# Patient Record
Sex: MALE | Race: WHITE | Employment: OTHER | URBAN - METROPOLITAN AREA
[De-identification: names, ages, dates, MRNs, and addresses within clinical notes are randomized per-mention and may not be internally consistent; named-entity substitution may affect disease eponyms.]

---

## 2017-02-24 ENCOUNTER — OFFICE VISIT (OUTPATIENT)
Dept: FAMILY MEDICINE CLINIC | Age: 82
End: 2017-02-24

## 2017-02-24 VITALS
SYSTOLIC BLOOD PRESSURE: 163 MMHG | WEIGHT: 169 LBS | HEART RATE: 96 BPM | HEIGHT: 68 IN | OXYGEN SATURATION: 100 % | TEMPERATURE: 98 F | DIASTOLIC BLOOD PRESSURE: 94 MMHG | RESPIRATION RATE: 16 BRPM | BODY MASS INDEX: 25.61 KG/M2

## 2017-02-24 DIAGNOSIS — R44.3 HALLUCINATIONS: Primary | ICD-10-CM

## 2017-02-24 DIAGNOSIS — R41.0 DELIRIUM: ICD-10-CM

## 2017-02-24 NOTE — MR AVS SNAPSHOT
Visit Information Date & Time Provider Department Dept. Phone Encounter #  
 2/24/2017  2:40 PM [id number] Herb Garcia [facility] [phone] [id number] Upcoming Health Maintenance Date Due DTaP/Tdap/Td series (1 - Tdap) 5/2/1951 ZOSTER VACCINE AGE 60> 5/2/1990 GLAUCOMA SCREENING Q2Y 5/2/1995 Pneumococcal 65+ Low/Medium Risk (1 of 2 - PCV13) 5/2/1995 MEDICARE YEARLY EXAM 5/2/1995 INFLUENZA AGE 9 TO ADULT 8/1/2016 Allergies as of 2/24/2017  Review Complete On: 2/24/2017 By: Singh Templeton LPN No Known Allergies Current Immunizations  Never Reviewed No immunizations on file. Not reviewed this visit You Were Diagnosed With   
  
 Codes Comments Hallucinations    -  Primary ICD-10-CM: R44.3 ICD-9-CM: 780.1 Delirium     ICD-10-CM: R41.0 ICD-9-CM: 780.09 Vitals BP  
  
  
  
  
  
 (!) 163/94 Vitals History BMI and BSA Data Body Mass Index Body Surface Area 25.7 kg/m 2 1.92 m 2 Your Updated Medication List  
  
   
This list is accurate as of: 2/24/17  4:09 PM.  Always use your most recent med list.  
  
  
  
  
 albuterol 90 mcg/actuation inhaler Commonly known as:  PROVENTIL HFA, VENTOLIN HFA, PROAIR HFA Take 2 Puffs by inhalation every four (4) hours as needed for Wheezing or Shortness of Breath. FLUAD 3104-6406 (65 YR UP)(PF) Syrg injection Generic drug:  influenza vaccine 2016-17 (65 yr+)(PF)  
ADM 0.5ML IM UTD We Performed the Following REFERRAL TO NEUROLOGY [WXC58 Custom] Comments:  
 Please evaluate patient for dementia/hallucinations REFERRAL TO PSYCHIATRY [REF91 Custom] Comments:  
 Please evaluate patient for hallucinations, dementia Referral Information Referral ID Referred By Referred To  
  
 [id number] Yossi PEDRO MD [address] [facility] [place], [address] [facility] [address] Phone: [phone] Fax: [phone] Visits Status Start Date End Date 1 New Request 2/24/17 2/24/18 If your referral has a status of pending review or denied, additional information will be sent to support the outcome of this decision. Referral ID Referred By Referred To  
 [id number] SUREKHA PEDRO Not Available Visits Status Start Date End Date 1 New Request 2/24/17 2/24/18 If your referral has a status of pending review or denied, additional information will be sent to support the outcome of this decision. Patient Instructions [address] [address], [address] [place], [address] Phone: [phone] Fax: [phone] [address] [facility] Office Building, [address] [place], [address] Phone: [phone] Fax: [phone] Alfredo Celestin M.D. [place], [address] Shan Urena M.D. [facility] [phone] [address], [address] [phone] [address] [place] [address], [address] 
[phone] Dementia: Care Instructions Your Care Instructions Dementia is a loss of mental skills that affects your daily life. It is different than the occasional trouble with memory that is part of aging. You may find it hard to remember things that you feel you should be able to remember. Or you may feel that your mind is just not working as well as usual. 
Finding out that you have dementia is a shock. You may be afraid and worried about how the condition will change your life. Although there is no cure at this time, medicine may slow memory loss and improve thinking for a while.  Other medicines may be able to help you sleep or cope with depression and behavior changes. Dementia often gets worse slowly. But it can get worse quickly. As dementia gets worse, it may become harder to do common things that take planning, like making a list and going shopping. Over time, the disease may make it hard for you to take care of yourself. Some people with dementia need others to help care for them. Dementia is different for everyone. You may be able to function well for a long time. In the early stage of the condition, you can do things at home to make life easier and safer. You also can keep doing your hobbies and other activities. Many people find comfort in planning now for their future needs. Follow-up care is a key part of your treatment and safety. Be sure to make and go to all appointments, and call your doctor if you are having problems. Its also a good idea to know your test results and keep a list of the medicines you take. How can you care for yourself at home? · Take your medicines exactly as prescribed. Call your doctor if you think you are having a problem with your medicine. · Eat healthy foods. Eat lots of whole grains, fruits, and vegetables every day. If you are not hungry, try snacks or nutritional drinks such as Boost, Ensure, or Sustacal. 
· If you have problems sleeping: ¨ Try not to nap too close to your bedtime. ¨ Exercise regularly. Walking is a good choice. ¨ Try a glass of warm milk or caffeine-free herbal tea before bed. · Do tasks and activities during the time of day when you feel your best. It may help to develop a daily routine. · Post labels, lists, and sticky notes to help you remember things. Write your activities on a calendar you can easily find. Put your clock where you can easily see it. · Stay active. Take walks in familiar places, or with friends or loved ones. Try to stay active mentally too. Read and work crossword puzzles if you enjoy these activities. · Do not drive unless you can pass an on-road driving test. If you are not sure if you are safe to drive, your state s license bureau can test you. · Keep a cordless phone and a flashlight with new batteries by your bed. If possible, put a phone in each of the main rooms of your house, or carry a cell phone in case you fall and cannot reach a phone. Or, you can wear a device around your neck or wrist. You push a button that sends a signal for help. Acknowledge your emotions and plan for the future · Talk openly and honestly with your doctor. · Let yourself grieve. It is common to feel angry, scared, frustrated, anxious, or depressed. · Get emotional support from family, friends, a support group, or a counselor experienced in working with people who have dementia. · Ask for help if you need it. · Plan for the future. ¨ Talk to your family and doctor about preparing a living will and other important papers while you can make decisions. These papers tell your doctors how to care for you at the end of your life. ¨ Consider naming a person to make decisions about your care if you are not able to. When should you call for help? Call 911 anytime you think you may need emergency care. For example, call if: 
· You are lost and do not know whom to call. · You are injured and do not know whom to call. Call your doctor now or seek immediate medical care if: 
· You are more confused or upset than usual. 
· You feel like you could hurt yourself because your mind is not working well. Watch closely for changes in your health, and be sure to contact your doctor if you have any problems. Where can you learn more? Go to http://ender-leon.info/. Enter E876 in the search box to learn more about \"Dementia: Care Instructions. \" Current as of: July 26, 2016 Content Version: 11.1 © 5857-1450 [employer].  Care instructions adapted under license by [address] (which disclaims liability or warranty for this information). If you have questions about a medical condition or this instruction, always ask your healthcare professional. Colt Miles any warranty or liability for your use of this information. .Delora Lanes Introducing [facility]! [employer] introduces [employer] patient portal. Now you can access parts of your medical record, email your doctor's office, and request medication refills online. 1. In your internet browser, go to [email or web address] Heiya 2. Click on the First Time User? Click Here link in the Sign In box. You will see the New Member Sign Up page. 3. Enter your [employer] Access Code exactly as it appears below. You will not need to use this code after youve completed the sign-up process. If you do not sign up before the expiration date, you must request a new code. · [employer] Access Code: [email or web address] Expires: 5/25/2017  4:09 PM 
 
4. Enter the last four digits of your Social Security Number (xxxx) and Date of Birth (mm/dd/yyyy) as indicated and click Submit. You will be taken to the next sign-up page. 5. Create a [employer] ID. This will be your [employer] login ID and cannot be changed, so think of one that is secure and easy to remember. 6. Create a [employer] password. You can change your password at any time. 7. Enter your Password Reset Question and Answer. This can be used at a later time if you forget your password. 8. Enter your e-mail address. You will receive e-mail notification when new information is available in [address] 9. Click Sign Up. You can now view and download portions of your medical record. 10. Click the Download Summary menu link to download a portable copy of your medical information. If you have questions, please visit the Frequently Asked Questions section of the [employer] website.  Remember, [employer] is NOT to be used for urgent needs. For medical emergencies, dial 911. Now available from your iPhone and Android! Please provide this summary of care documentation to your next provider. Your primary care clinician is listed as Nicole Jacinto. If you have any questions after today's visit, please call [phone].

## 2017-02-24 NOTE — PATIENT INSTRUCTIONS
[address] [facility] [address], [address] [facility], [address]  Phone: [phone]  Fax: [phone]    [address]  [address], [address] [facility], [address]  Phone: [phone]  Fax: [phone]    Clau Ni M.D. [place], [address] [facility], [address] [facility]    Josseline Dong M.D. [facility]  [phone]    [address]  [phone]  [phone]    [address] [place] [address]  [address] [place] [address], [address] [place] [address]  [place], [address]  [phone]     Dementia: Care Instructions  Your Care Instructions  Dementia is a loss of mental skills that affects your daily life. It is different than the occasional trouble with memory that is part of aging. You may find it hard to remember things that you feel you should be able to remember. Or you may feel that your mind is just not working as well as usual.  Finding out that you have dementia is a shock. You may be afraid and worried about how the condition will change your life. Although there is no cure at this time, medicine may slow memory loss and improve thinking for a while. Other medicines may be able to help you sleep or cope with depression and behavior changes. Dementia often gets worse slowly. But it can get worse quickly. As dementia gets worse, it may become harder to do common things that take planning, like making a list and going shopping. Over time, the disease may make it hard for you to take care of yourself. Some people with dementia need others to help care for them. Dementia is different for everyone. You may be able to function well for a long time. In the early stage of the condition, you can do things at home to make life easier and safer. You also can keep doing your hobbies and other activities.  Many people find comfort in planning now for their future needs. Follow-up care is a key part of your treatment and safety. Be sure to make and go to all appointments, and call your doctor if you are having problems. Its also a good idea to know your test results and keep a list of the medicines you take. How can you care for yourself at home? · Take your medicines exactly as prescribed. Call your doctor if you think you are having a problem with your medicine. · Eat healthy foods. Eat lots of whole grains, fruits, and vegetables every day. If you are not hungry, try snacks or nutritional drinks such as Boost, Ensure, or Sustacal.  · If you have problems sleeping:  ¨ Try not to nap too close to your bedtime. ¨ Exercise regularly. Walking is a good choice. ¨ Try a glass of warm milk or caffeine-free herbal tea before bed. · Do tasks and activities during the time of day when you feel your best. It may help to develop a daily routine. · Post labels, lists, and sticky notes to help you remember things. Write your activities on a calendar you can easily find. Put your clock where you can easily see it. · Stay active. Take walks in familiar places, or with friends or loved ones. Try to stay active mentally too. Read and work crossword puzzles if you enjoy these activities. · Do not drive unless you can pass an on-road driving test. If you are not sure if you are safe to drive, your state s license bureau can test you. · Keep a cordless phone and a flashlight with new batteries by your bed. If possible, put a phone in each of the main rooms of your house, or carry a cell phone in case you fall and cannot reach a phone. Or, you can wear a device around your neck or wrist. You push a button that sends a signal for help. Acknowledge your emotions and plan for the future  · Talk openly and honestly with your doctor. · Let yourself grieve. It is common to feel angry, scared, frustrated, anxious, or depressed.   · Get emotional support from family, friends, a support group, or a counselor experienced in working with people who have dementia. · Ask for help if you need it. · Plan for the future. ¨ Talk to your family and doctor about preparing a living will and other important papers while you can make decisions. These papers tell your doctors how to care for you at the end of your life. ¨ Consider naming a person to make decisions about your care if you are not able to. When should you call for help? Call 911 anytime you think you may need emergency care. For example, call if:  · You are lost and do not know whom to call. · You are injured and do not know whom to call. Call your doctor now or seek immediate medical care if:  · You are more confused or upset than usual.  · You feel like you could hurt yourself because your mind is not working well. Watch closely for changes in your health, and be sure to contact your doctor if you have any problems. Where can you learn more? Go to http://ender-leon.info/. Enter U432 in the search box to learn more about \"Dementia: Care Instructions. \"  Current as of: July 26, 2016  Content Version: 11.1  © 9604-7235 [employer]. Care instructions adapted under license by [employer] (which disclaims liability or warranty for this information). If you have questions about a medical condition or this instruction, always ask your healthcare professional. [address] [address] any warranty or liability for your use of this information.

## 2017-02-24 NOTE — PROGRESS NOTES
HISTORY OF PRESENT ILLNESS  Jose Armando Contreras Sr. is a 80 y.o. male. HPI  79 yo male who presents as a new patient with his partner. He reports that his chief complaint is to 'get him away from her' (in reference to his partner). Patient was unable to give a proper history voicing that partner is having affairs with multiple men, she hates him and is trying to take his money. Partner states that this is all untrue and patient has been deteriorating since last year and has had hallucinations about people that weren't present and has been bothering the neighbors with untrue information regarding intruders etc. Evaluated by Dr. Ortega Lord, a neurologist for dementia and brought paperwork along which only states patient has dementia and hallucinations-no other information provided. Pt last saw PCP Sharmin Perez back in June for a physical and appears to have had a regular physical at that time-no behavioral issues noted on exam. Attempted to call Dr. Gregoria Avila office but she was not available at the time. Partner reports that they have not been back for another evaluation since. She states that Dr. Niharika Ramirez had asked the patient to be seen by a primary care physician for his dementia. Patient and partner were both in disagreement throughout the visit and it was very difficult to get a full hx from them. Upon asking pt what he thinks is going on he reported \"everyone thinks I am crazy but shes the one that's crazy, even my children think that I am crazy. \" Attempted to call son who lives nearby but phone call was not answered. Pt denied any suicidal ideation or intention to hurt himself or others. ROS   Constitutional:  Cardiac: Denied chest pain, SOB  Respiratory: No SOB  Neuro/psych: Denied headaches, anxiety, or hallucinations.    Physical Exam   Visit Vitals    BP (!) 163/94    Pulse 96    Temp 98 °F (36.7 °C) (Oral)    Resp 16    Ht 5' 8\" (1.727 m)    Wt 169 lb (76.7 kg)    SpO2 100%    BMI 25.7 kg/m2 General: patient unable to stay focused, stepping out of room to avoid partner, not in any acute distress but appears disheveled  Heart: RRR  Lungs: CTAB  Neuro/Psych: A&O X 4. Patient having paranoid thoughts about partner cheating on him. Denies suicidal ideation or intent to harm. CN II-XII grossly intact. ASSESSMENT and PLAN    ICD-10-CM ICD-9-CM    1. Hallucinations R44.3 780.1 REFERRAL TO NEUROLOGY      REFERRAL TO PSYCHIATRY   2. Delirium R41.0 780.09      Delirium/Hallucinations: Very complicated patient. Unclear reason for visit-they were told by neurology to be assessed further in terms of dementia/hallucinations. Not in acute danger to himself or others and denied suicidal ideation multiple times. Pt with paranoid thoughts and visual hallucinations. Appropriate candidate for psych referral and further neurology evaluation. Referrals given to psychiatry and neurology and encouraged them to arrange appointments ASAP.

## 2017-02-24 NOTE — PROGRESS NOTES
80year old male -- here with lifetime partner -- seen by neurology -- told that he had dementia -- patient states that she is having an affair -- partner denies, stating that he is having hallucinations

## 2017-02-27 NOTE — PROGRESS NOTES
I saw and evaluated the patient, performing the key elements of the service. I discussed the findings, assessment and plan with the resident and agree with the resident's findings and plan as documented in the resident's note.     Pt referred to psychiatry

## 2017-06-29 ENCOUNTER — DOCUMENTATION ONLY (OUTPATIENT)
Dept: FAMILY MEDICINE CLINIC | Age: 82
End: 2017-06-29

## 2017-06-29 NOTE — PROGRESS NOTES
Pt's appt is at 3:15pm, PSR explained to caregiver 3x that PSR can talk to dr about seeing sooner but she refused, nurse witnessed conversation. Pt's caregiver states appt was at 1:45pm and that she scheduled it on 6.28.17. Appt for 6.29.17 was scheduled on 6.28.17 and the appt was originally scheduled for 3:30 and then was r/s to 3:15pm. Pt's caregiver has cancelled 5x.

## 2017-07-28 ENCOUNTER — OFFICE VISIT (OUTPATIENT)
Dept: FAMILY MEDICINE CLINIC | Age: 82
End: 2017-07-28

## 2017-07-28 VITALS
SYSTOLIC BLOOD PRESSURE: 160 MMHG | HEART RATE: 80 BPM | DIASTOLIC BLOOD PRESSURE: 87 MMHG | RESPIRATION RATE: 16 BRPM | WEIGHT: 167 LBS | OXYGEN SATURATION: 97 % | TEMPERATURE: 98.2 F | HEIGHT: 68 IN | BODY MASS INDEX: 25.31 KG/M2

## 2017-07-28 DIAGNOSIS — Z13.31 SCREENING FOR DEPRESSION: ICD-10-CM

## 2017-07-28 DIAGNOSIS — L03.116 CELLULITIS OF LEFT FOOT: Primary | ICD-10-CM

## 2017-07-28 DIAGNOSIS — M79.672 LEFT FOOT PAIN: ICD-10-CM

## 2017-07-28 DIAGNOSIS — Z92.29 TETANUS TOXOID VACCINATION ADMINISTERED GREATER THAN 10 YEARS AGO: ICD-10-CM

## 2017-07-28 RX ORDER — CEFTRIAXONE 1 G/1
1 INJECTION, POWDER, FOR SOLUTION INTRAMUSCULAR; INTRAVENOUS ONCE
Qty: 1 VIAL | Refills: 0
Start: 2017-07-28 | End: 2017-07-28

## 2017-07-28 RX ORDER — TRAVOPROST 0.04 MG/ML
0 SOLUTION/ DROPS OPHTHALMIC 2 TIMES DAILY
Refills: 1 | COMMUNITY
Start: 2017-06-15

## 2017-07-28 RX ORDER — CEPHALEXIN 500 MG/1
500 CAPSULE ORAL 2 TIMES DAILY
Qty: 14 CAP | Refills: 0 | Status: SHIPPED | OUTPATIENT
Start: 2017-07-28 | End: 2017-08-04

## 2017-07-28 RX ORDER — TIMOLOL MALEATE 5 MG/ML
0.5 SOLUTION/ DROPS OPHTHALMIC 2 TIMES DAILY
Refills: 1 | COMMUNITY
Start: 2017-06-15

## 2017-07-28 NOTE — PATIENT INSTRUCTIONS
Cellulitis: Care Instructions  Your Care Instructions    Cellulitis is a skin infection. It often occurs after a break in the skin from a scrape, cut, bite, or puncture, or after a rash. The doctor has checked you carefully, but problems can develop later. If you notice any problems or new symptoms, get medical treatment right away. Follow-up care is a key part of your treatment and safety. Be sure to make and go to all appointments, and call your doctor if you are having problems. It's also a good idea to know your test results and keep a list of the medicines you take. How can you care for yourself at home? · Take your antibiotics as directed. Do not stop taking them just because you feel better. You need to take the full course of antibiotics. · Prop up the infected area on pillows to reduce pain and swelling. Try to keep the area above the level of your heart as often as you can. · If your doctor told you how to care for your wound, follow your doctor's instructions. If you did not get instructions, follow this general advice:  ¨ Wash the wound with clean water 2 times a day. Don't use hydrogen peroxide or alcohol, which can slow healing. ¨ You may cover the wound with a thin layer of petroleum jelly, such as Vaseline, and a nonstick bandage. ¨ Apply more petroleum jelly and replace the bandage as needed. · Be safe with medicines. Take pain medicines exactly as directed. ¨ If the doctor gave you a prescription medicine for pain, take it as prescribed. ¨ If you are not taking a prescription pain medicine, ask your doctor if you can take an over-the-counter medicine. To prevent cellulitis in the future  · Try to prevent cuts, scrapes, or other injuries to your skin. Cellulitis most often occurs where there is a break in the skin. · If you get a scrape, cut, mild burn, or bite, wash the wound with clean water as soon as you can to help avoid infection.  Don't use hydrogen peroxide or alcohol, which can slow healing. · If you have swelling in your legs (edema), support stockings and good skin care may help prevent leg sores and cellulitis. · Take care of your feet, especially if you have diabetes or other conditions that increase the risk of infection. Wear shoes and socks. Do not go barefoot. If you have athlete's foot or other skin problems on your feet, talk to your doctor about how to treat them. When should you call for help? Call your doctor now or seek immediate medical care if:  · You have signs that your infection is getting worse, such as:  ¨ Increased pain, swelling, warmth, or redness. ¨ Red streaks leading from the area. ¨ Pus draining from the area. ¨ A fever. · You get a rash. Watch closely for changes in your health, and be sure to contact your doctor if:  · You are not getting better after 1 day (24 hours). · You do not get better as expected. Where can you learn more? Go to http://ender-leon.info/. Diego Narvaez in the search box to learn more about \"Cellulitis: Care Instructions. \"  Current as of: October 13, 2016  Content Version: 11.3  © 1429-0790 [employer]. Care instructions adapted under license by [employer] (which disclaims liability or warranty for this information). If you have questions about a medical condition or this instruction, always ask your healthcare professional. [place] [address] any warranty or liability for your use of this information. Saline Nasal Washes: Care Instructions  Your Care Instructions  Saline nasal washes help keep the nasal passages open by washing out thick or dried mucus. This simple remedy can help relieve symptoms of allergies, sinusitis, and colds.  It also can make the nose feel more comfortable by keeping the mucous membranes moist. You may notice a little burning sensation in your nose the first few times you use the solution, but this usually gets better in a few days.  Follow-up care is a key part of your treatment and safety. Be sure to make and go to all appointments, and call your doctor if you are having problems. It's also a good idea to know your test results and keep a list of the medicines you take. How can you care for yourself at home? · You can buy premixed saline solution in a squeeze bottle or other sinus rinse products at a drugstore. Read and follow the instructions on the label. · You also can make your own saline solution by adding 1 teaspoon of salt and 1 teaspoon of baking soda to 2 cups of distilled water. · If you use a homemade solution, pour a small amount into a clean bowl. Using a rubber bulb syringe, squeeze the syringe and place the tip in the salt water. Pull a small amount of the salt water into the syringe by relaxing your hand. · Sit down with your head tilted slightly back. Do not lie down. Put the tip of the bulb syringe or the squeeze bottle a little way into one of your nostrils. Gently drip or squirt a few drops into the nostril. Repeat with the other nostril. Some sneezing and gagging are normal at first.  · Gently blow your nose. · Wipe the syringe or bottle tip clean after each use. · Repeat this 2 or 3 times a day. · Use nasal washes gently if you have nosebleeds often. When should you call for help? Watch closely for changes in your health, and be sure to contact your doctor if:  · You often get nosebleeds. · You have problems doing the nasal washes. Where can you learn more? Go to http://ender-leon.info/. Enter [id number] [phone] in the search box to learn more about \"Saline Nasal Washes: Care Instructions. \"  Current as of: May 4, 2017  Content Version: 11.3  © 3222-6814 [employer]. Care instructions adapted under license by [employer] (which disclaims liability or warranty for this information).  If you have questions about a medical condition or this instruction, always ask your healthcare professional. [address] any warranty or liability for your use of this information.

## 2017-07-28 NOTE — PROGRESS NOTES
Chief Complaint   Patient presents with    Leg Pain     stepped in a vent in the hourse     1. Have you been to the ER, urgent care clinic since your last visit? Hospitalized since your last visit? No    2. Have you seen or consulted any other health care providers outside of the [address] [place] [address] since your last visit? Include any pap smears or colon screening. No     Limping on left foot--area near ankle bone looks pretty angry as well as the inside of it as well--happened 2 days ago.     Has been putting peroxide on it    Soaked in epsom salt

## 2017-07-28 NOTE — PROGRESS NOTES
GLF two days ago  Has open wounds on left lower leg, mildly erythematous, edematous  Plan to xray left foot and outpatient antibiotics  Denies pain unless walking    PE:  LLE -- abrasions on the medial and lateral aspects; erythema, edema over the ankle, foot, lower aspect of the leg; areas of redness marked     Plan:  Administer cefriaxone 1 g IM now in the office  Xray of left foot  TDAP  rx keflex for 7 days  F/U in the office tomorrow for assessment    I saw and evaluated the patient, performing the key elements of the service. I discussed the findings, assessment and plan with the resident and agree with the resident's findings and plan as documented in the resident's note.

## 2017-07-28 NOTE — MR AVS SNAPSHOT
Visit Information Date & Time Provider Department Dept. Phone Encounter #  
 7/28/2017  1:30 PM Marion Kerr, Niharika [facility] [phone] [id number] Follow-up Instructions Return in about 1 day (around 7/29/2017). Your Appointments 7/29/2017 10:15 AM  
ROUTINE CARE with Aishwarya Ron MD  
[address]) Appt Note: cellulitis [address] [facility],[place] [address] [facility] [facility]  
[phone]  
  
   
 [address] [place] [address],[place] [address] [address]  
  
    
 8/28/2017  2:15 PM  
Medicare Physical with Jeffrey Crowley MD  
[address]) Appt Note: LVM pt is due for medicare subsequent MCR  7/12/17 CDY, cm  
 [address] [facility] [address] [facility]  
[phone]  
  
   
 [address] [place] [address],[place] [address] [address] Upcoming Health Maintenance Date Due DTaP/Tdap/Td series (1 - Tdap) 5/2/1951 ZOSTER VACCINE AGE 60> 3/2/1990 GLAUCOMA SCREENING Q2Y 5/2/1995 Pneumococcal 65+ Low/Medium Risk (1 of 2 - PCV13) 5/2/1995 MEDICARE YEARLY EXAM 5/2/1995 INFLUENZA AGE 9 TO ADULT 8/1/2017 Allergies as of 7/28/2017  Review Complete On: 7/28/2017 By: Marion Kerr, DO No Known Allergies Current Immunizations  Never Reviewed Name Date Tdap  Incomplete Not reviewed this visit You Were Diagnosed With   
  
 Codes Comments Cellulitis of left foot    -  Primary ICD-10-CM: A25.482 ICD-9-CM: 682.7 Left foot pain     ICD-10-CM: M33.280 ICD-9-CM: 729.5 Tetanus toxoid vaccination administered greater than 10 years ago     ICD-10-CM: Z78.9 ICD-9-CM: V49.89 Vitals BP Pulse Temp Resp Height(growth percentile) Weight(growth percentile) (!) 155/94 73 98.2 °F (36.8 °C) (Oral) 16 5' 8\" (1.727 m) 167 lb (75.8 kg) SpO2 BMI Smoking Status 97% 25.39 kg/m2 Never Smoker Vitals History BMI and BSA Data Body Mass Index Body Surface Area  
 25.39 kg/m 2 1.91 m 2 Preferred Pharmacy Pharmacy Name Phone [facility] [address] AT [address] [facility] [phone] Your Updated Medication List  
  
   
This list is accurate as of: 7/28/17  2:21 PM.  Always use your most recent med list.  
  
  
  
  
 albuterol 90 mcg/actuation inhaler Commonly known as:  PROVENTIL HFA, VENTOLIN HFA, PROAIR HFA Take 2 Puffs by inhalation every four (4) hours as needed for Wheezing or Shortness of Breath. cefTRIAXone 1 gram injection Commonly known as:  ROCEPHIN  
1 g by IntraMUSCular route once for 1 dose. cephALEXin 500 mg capsule Commonly known as:  Matt Saas Take 1 Cap by mouth two (2) times a day for 7 days. FLUAD 7162-2203 (65 YR UP)(PF) Syrg injection Generic drug:  influenza vaccine 2016-17 (65 yr+)(PF)  
ADM 0.5ML IM UTD  
  
 timolol 0.5 % ophthalmic solution Commonly known as:  TIMOPTIC Apply 0.5 mL to eye two (2) times a day. TRAVATAN Z 0.004 % ophthalmic solution Generic drug:  travoprost  
Apply 0.004 mL to eye two (2) times a day. Prescriptions Sent to Pharmacy Refills  
 cephALEXin (KEFLEX) 500 mg capsule 0 Sig: Take 1 Cap by mouth two (2) times a day for 7 days. Class: Normal  
 Pharmacy: [facility] [address] [place] [address] [address] AT [facility] of [address] [facility] Ph #: [phone] Route: Oral  
  
We Performed the Following TETANUS, DIPHTHERIA TOXOIDS AND ACELLULAR PERTUSSIS VACCINE (TDAP), IN INDIVIDS. >=7, IM [id number] CPT(R)] Follow-up Instructions Return in about 1 day (around 7/29/2017). To-Do List   
 07/28/2017 Imaging:  XR ANKLE LT MIN 3 V   
  
 07/28/2017 Imaging:  XR FOOT LT MIN 3 V Patient Instructions Cellulitis: Care Instructions Your Care Instructions Cellulitis is a skin infection. It often occurs after a break in the skin from a scrape, cut, bite, or puncture, or after a rash. The doctor has checked you carefully, but problems can develop later. If you notice any problems or new symptoms, get medical treatment right away. Follow-up care is a key part of your treatment and safety. Be sure to make and go to all appointments, and call your doctor if you are having problems. It's also a good idea to know your test results and keep a list of the medicines you take. How can you care for yourself at home? · Take your antibiotics as directed. Do not stop taking them just because you feel better. You need to take the full course of antibiotics. · Prop up the infected area on pillows to reduce pain and swelling. Try to keep the area above the level of your heart as often as you can. · If your doctor told you how to care for your wound, follow your doctor's instructions. If you did not get instructions, follow this general advice: ¨ Wash the wound with clean water 2 times a day. Don't use hydrogen peroxide or alcohol, which can slow healing. ¨ You may cover the wound with a thin layer of petroleum jelly, such as Vaseline, and a nonstick bandage. ¨ Apply more petroleum jelly and replace the bandage as needed. · Be safe with medicines. Take pain medicines exactly as directed. ¨ If the doctor gave you a prescription medicine for pain, take it as prescribed. ¨ If you are not taking a prescription pain medicine, ask your doctor if you can take an over-the-counter medicine. To prevent cellulitis in the future · Try to prevent cuts, scrapes, or other injuries to your skin. Cellulitis most often occurs where there is a break in the skin. · If you get a scrape, cut, mild burn, or bite, wash the wound with clean water as soon as you can to help avoid infection. Don't use hydrogen peroxide or alcohol, which can slow healing. · If you have swelling in your legs (edema), support stockings and good skin care may help prevent leg sores and cellulitis. · Take care of your feet, especially if you have diabetes or other conditions that increase the risk of infection. Wear shoes and socks. Do not go barefoot. If you have athlete's foot or other skin problems on your feet, talk to your doctor about how to treat them. When should you call for help? Call your doctor now or seek immediate medical care if: 
· You have signs that your infection is getting worse, such as: 
¨ Increased pain, swelling, warmth, or redness. ¨ Red streaks leading from the area. ¨ Pus draining from the area. ¨ A fever. · You get a rash. Watch closely for changes in your health, and be sure to contact your doctor if: 
· You are not getting better after 1 day (24 hours). · You do not get better as expected. Where can you learn more? Go to http://enderSense.ly.info/. Gwenevere Danger in the search box to learn more about \"Cellulitis: Care Instructions. \" Current as of: October 13, 2016 Content Version: 11.3 © 6277-2905 PumpUp. Care instructions adapted under license by [employer] (which disclaims liability or warranty for this information). If you have questions about a medical condition or this instruction, always ask your healthcare professional. [place] [address] any warranty or liability for your use of this information. Saline Nasal Washes: Care Instructions Your Care Instructions Saline nasal washes help keep the nasal passages open by washing out thick or dried mucus. This simple remedy can help relieve symptoms of allergies, sinusitis, and colds. It also can make the nose feel more comfortable by keeping the mucous membranes moist. You may notice a little burning sensation in your nose the first few times you use the solution, but this usually gets better in a few days. Follow-up care is a key part of your treatment and safety. Be sure to make and go to all appointments, and call your doctor if you are having problems. It's also a good idea to know your test results and keep a list of the medicines you take. How can you care for yourself at home? · You can buy premixed saline solution in a squeeze bottle or other sinus rinse products at a drugstore. Read and follow the instructions on the label. · You also can make your own saline solution by adding 1 teaspoon of salt and 1 teaspoon of baking soda to 2 cups of distilled water. · If you use a homemade solution, pour a small amount into a clean bowl. Using a rubber bulb syringe, squeeze the syringe and place the tip in the salt water. Pull a small amount of the salt water into the syringe by relaxing your hand. · Sit down with your head tilted slightly back. Do not lie down. Put the tip of the bulb syringe or the squeeze bottle a little way into one of your nostrils. Gently drip or squirt a few drops into the nostril. Repeat with the other nostril. Some sneezing and gagging are normal at first. 
· Gently blow your nose. · Wipe the syringe or bottle tip clean after each use. · Repeat this 2 or 3 times a day. · Use nasal washes gently if you have nosebleeds often. When should you call for help? Watch closely for changes in your health, and be sure to contact your doctor if: 
· You often get nosebleeds. · You have problems doing the nasal washes. Where can you learn more? Go to http://ender-leon.info/. Enter [phone] in the search box to learn more about \"Saline Nasal Washes: Care Instructions. \" Current as of: May 4, 2017 Content Version: 11.3 © 4805-6677 [employer]. Care instructions adapted under license by [employer] (which disclaims liability or warranty for this information).  If you have questions about a medical condition or this instruction, always ask your healthcare professional. [address]  any warranty or liability for your use of this information. Introducing [facility]! [facility] introduces HepatoChem patient portal. Now you can access parts of your medical record, email your doctor's office, and request medication refills online. 1. In your internet browser, go to [email or web address] 2. Click on the First Time User? Click Here link in the Sign In box. You will see the New Member Sign Up page. 3. Enter your HepatoChem Access Code exactly as it appears below. You will not need to use this code after youve completed the sign-up process. If you do not sign up before the expiration date, you must request a new code. · HepatoChem Access Code: [email or web address] Expires: 10/26/2017  2:21 PM 
 
4. Enter the last four digits of your Social Security Number (xxxx) and Date of Birth (mm/dd/yyyy) as indicated and click Submit. You will be taken to the next sign-up page. 5. Create a HepatoChem ID. This will be your HepatoChem login ID and cannot be changed, so think of one that is secure and easy to remember. 6. Create a HepatoChem password. You can change your password at any time. 7. Enter your Password Reset Question and Answer. This can be used at a later time if you forget your password. 8. Enter your e-mail address. You will receive e-mail notification when new information is available in [address]. 9. Click Sign Up. You can now view and download portions of your medical record. 10. Click the Download Summary menu link to download a portable copy of your medical information. If you have questions, please visit the Frequently Asked Questions section of the HepatoChem website. Remember, HepatoChem is NOT to be used for urgent needs. For medical emergencies, dial 911. Now available from your iPhone and Android! Please provide this summary of care documentation to your next provider. Your primary care clinician is listed as Socrates Ronquillo. If you have any questions after today's visit, please call [phone].

## 2017-07-28 NOTE — PROGRESS NOTES
Subjective  Brennan Hu Sr. is an 80 y.o. male who presents for ***    steped pn floor vent. 2 days ago. Benjamín Puff till middle leg. Only when you walk. Left foot sweeling. Pain when you walk. Can bear wait but hurts him. 7/10. No pain at rest.     No tran at home    No foot doctor. podietry for nail cut. No fevers at home. tx peroide    Lateral 4 laceration  Medial 3 laceration update       psudafed abuse    Allergies - reviewed:   No Known Allergies      Medications - reviewed:   Current Outpatient Prescriptions   Medication Sig    timolol (TIMOPTIC) 0.5 % ophthalmic solution Apply 0.5 mL to eye two (2) times a day.  TRAVATAN Z 0.004 % ophthalmic solution Apply 0.004 mL to eye two (2) times a day.  cefTRIAXone (ROCEPHIN) 1 gram injection 1 g by IntraMUSCular route once for 1 dose.  cephALEXin (KEFLEX) 500 mg capsule Take 1 Cap by mouth two (2) times a day for 7 days.  FLUAD 9230-0427, 65 YR UP,,PF, syrg injection ADM 0.5ML IM UTD    albuterol (PROVENTIL HFA, VENTOLIN HFA, PROAIR HFA) 90 mcg/actuation inhaler Take 2 Puffs by inhalation every four (4) hours as needed for Wheezing or Shortness of Breath. No current facility-administered medications for this visit. Past Medical History - reviewed:  No past medical history on file. Past Surgical History - reviewed:   Past Surgical History:   Procedure Laterality Date    HX LITHOTRIPSY           Social History - reviewed:  Social History     Social History    Marital status:      Spouse name: N/A    Number of children: N/A    Years of education: N/A     Occupational History    Not on file. Social History Main Topics    Smoking status: Never Smoker    Smokeless tobacco: Not on file    Alcohol use No    Drug use: Not on file    Sexual activity: Not on file     Other Topics Concern    Not on file     Social History Narrative         Family History - reviewed:  No family history on file.       Immunizations - reviewed: There is no immunization history for the selected administration types on file for this patient. Flu: ***      ROS  ROS   Constitutional:  Cardiac: Denied chest pain, SOB  Respiratory: No SOB  Neuro/psych: Denied headaches, anxiety, or hallucinations. Physical Exam  Visit Vitals    BP (!) 155/94    Pulse 73    Temp 98.2 °F (36.8 °C) (Oral)    Resp 16    Ht 5' 8\" (1.727 m)    Wt 167 lb (75.8 kg)    SpO2 97%    BMI 25.39 kg/m2       General appearance - {appearance:025491::\"alert, well appearing, and in no distress\"}  Eyes - {pe eye exam abnormal findings:901908::\"pupils equal and reactive, extraocular eye movements intact\"}  Ears - {pe ears normal/abnormal:459569::\"bilateral TM's and external ear canals normal\"}  Nose - {pe nose:921752::\"normal and patent, no erythema, discharge or polyps\"}  Mouth - {mouth:438356::\"mucous membranes moist, pharynx normal without lesions\"}  Neck - {pe neck:714268::\"supple, no significant adenopathy\"}  Chest - {chest:205043::\"clear to auscultation, no wheezes, rales or rhonchi, symmetric air entry\"}  Heart - {heart exam:248292::\"normal rate, regular rhythm, normal S1, S2, no murmurs, rubs, clicks or gallops\"}  Abdomen - {abd exam:169190::\"soft, nontender, nondistended, no masses or organomegaly\"}  Neurological - {neuro:187976::\"alert, oriented, normal speech, no focal findings or movement disorder noted\"}  Musculoskeletal - {msk exam:449099::\"no joint tenderness, deformity or swelling\"}  Extremities - {extremities:638077::\"peripheral pulses normal, no pedal edema, no clubbing or cyanosis\"}  Skin - {skin exam:795828::\"normal coloration and turgor, no rashes, no suspicious skin lesions noted\"}      Cellulitis of left foot. Margins drawn        Assessment/Plan    ICD-10-CM ICD-9-CM    1. Cellulitis of left foot L03.116 682. 7 cefTRIAXone (ROCEPHIN) 1 gram injection      cephALEXin (KEFLEX) 500 mg capsule   2.  Left foot pain M79.672 729.5 XR FOOT LT MIN 3 V XR ANKLE LT MIN 3 V   3. Tetanus toxoid vaccination administered greater than 10 years ago Z78.9 V49.89 TETANUS, DIPHTHERIA TOXOIDS AND ACELLULAR PERTUSSIS VACCINE (TDAP), IN INDIVIDS. >=7, IM       Will get ceftriaxon 1 g today    Keflex for 7 days op. Margins drawn. If not improved today pt to be sent to ED. bp recheck    Need tran today. Update tetnus    Come back tomorrow for recheck    Sinus use saline nasal spray    When to go to ED. Follow-up Disposition:  Return in about 1 day (around 7/29/2017). I have discussed the diagnosis with the patient and the intended plan as seen in the above orders. Patient verbalized understanding of the plan and agrees with the plan. The patient has received an after-visit summary and questions were answered concerning future plans. I have discussed medication side effects and warnings with the patient as well. Informed patient to return to the office if new symptoms arise.         Urban Garrett, DO  Family Medicine Resident

## 2017-07-29 ENCOUNTER — OFFICE VISIT (OUTPATIENT)
Dept: FAMILY MEDICINE CLINIC | Age: 82
End: 2017-07-29

## 2017-07-29 VITALS
HEIGHT: 68 IN | OXYGEN SATURATION: 98 % | TEMPERATURE: 97.8 F | HEART RATE: 69 BPM | BODY MASS INDEX: 25.31 KG/M2 | WEIGHT: 167 LBS | SYSTOLIC BLOOD PRESSURE: 147 MMHG | DIASTOLIC BLOOD PRESSURE: 81 MMHG | RESPIRATION RATE: 16 BRPM

## 2017-07-29 DIAGNOSIS — L03.116 CELLULITIS OF LEFT FOOT: Primary | ICD-10-CM

## 2017-07-29 RX ORDER — MUPIROCIN 20 MG/G
OINTMENT TOPICAL DAILY
Qty: 22 G | Refills: 0 | Status: SHIPPED | OUTPATIENT
Start: 2017-07-29

## 2017-07-29 NOTE — PROGRESS NOTES
Subjective  Jose Dugan Sr. is an 80 y.o. male who presents for left foot swelling. Pt presents with his friend/caretaker. She is concerned about the pt's foot as it is getting worse each day. Pt states that on 7/26 he accidentally stepped on the floor air vent and his left foot, ankle, and lower leg went through the vent. No fall or trauma to any other body part noted. After he got his left foot out he noticed skin lacerations on the medial and lateral aspects of the left distal leg. Was able to bare weight of the foot but led to 7/10 pain. States that the foot just gotten swollen and red each day. No pain at rest. ROM intact and painless with no weight bare. Baseline pt's gait is not appropriate and has been told by many providers that he needs a tran or walker to avoid falls. Denies chills, fever, CP, SOB, dizziness. Used to be followed by podiatry, but that podiatrist retired. Pt does not cut toe nails. Sinus problem: pt states that he cannot breath through his nose. Currently using Sudafed sinus, which initially worked but no longer giving his relief even when he attempted to take more than he used too. In past saline nasal sprays worked. Pt has not noticed increased in BP when he checks it at home with caretaker. No SOB or sinus pressure today. States 10+ since last tetanus vaccine    Allergies - reviewed:   No Known Allergies      Medications - reviewed:   Current Outpatient Prescriptions   Medication Sig    timolol (TIMOPTIC) 0.5 % ophthalmic solution Apply 0.5 mL to eye two (2) times a day.  TRAVATAN Z 0.004 % ophthalmic solution Apply 0.004 mL to eye two (2) times a day.  cephALEXin (KEFLEX) 500 mg capsule Take 1 Cap by mouth two (2) times a day for 7 days.     FLUAD [id number], 65 YR UP,,PF, syrg injection ADM 0.5ML IM UTD    albuterol (PROVENTIL HFA, VENTOLIN HFA, PROAIR HFA) 90 mcg/actuation inhaler Take 2 Puffs by inhalation every four (4) hours as needed for Wheezing or Shortness of Breath. No current facility-administered medications for this visit. Past Medical History - reviewed:  No past medical history on file. Past Surgical History - reviewed:   Past Surgical History:   Procedure Laterality Date    HX LITHOTRIPSY           Social History - reviewed:  Social History     Social History    Marital status:      Spouse name: N/A    Number of children: N/A    Years of education: N/A     Occupational History    Not on file. Social History Main Topics    Smoking status: Never Smoker    Smokeless tobacco: Not on file    Alcohol use No    Drug use: Not on file    Sexual activity: Not on file     Other Topics Concern    Not on file     Social History Narrative         Family History - reviewed:  No family history on file. Immunizations - reviewed:   Immunization History   Administered Date(s) Administered    Tdap 07/28/2017       ROS  ROS   Constitutional: No fever, chills, night sweats  Cardiac: Denied chest pain, SOB  Respiratory: No SOB or sinus pressure  MSK: Edema and erythema in the left lower leg and foot. No sensation loss or increased weakness. Neuro/psych: Denied headaches, anxiety, or hallucinations. Physical Exam  Visit Vitals    /87    Pulse 80    Temp 98.2 °F (36.8 °C) (Oral)    Resp 16    Ht 5' 8\" (1.727 m)    Wt 167 lb (75.8 kg)    SpO2 97%    BMI 25.39 kg/m2       General appearance - alert, well appearing, and in no distress.   Eyes - pupils equal and reactive, extraocular eye movements intact  Nose - normal and patent, no erythema, discharge or polyps and normal nontender sinuses  Neck - supple, no significant adenopathy  Chest - clear to auscultation, no wheezes, rales or rhonchi, symmetric air entry  Heart - normal rate, regular rhythm, normal S1, S2, no murmurs, rubs, clicks or gallops  Abdomen - soft, nontender, nondistended, no masses or organomegaly  Neurological - alert, oriented, normal speech, no focal findings noted, cranial nerves II through XII intact. Gait unbalanced at baseline. No signs of hallucinations. Extremities - LLE pt has healing abrasions on medial (3) and lateral (4) aspects. Left lower leg, ankle, and foot are warm, erythema noted, and edema (1+ in the foot). Margins of the cellulitis marked. No point tenderness in foot or leg. sensation in tact. PROM non painful and equal to the left ankle ROM. Able to weight bare. Assessment/Plan    ICD-10-CM ICD-9-CM    1. Cellulitis of left foot L03.116 682. 7 cefTRIAXone (ROCEPHIN) 1 gram injection      cephALEXin (KEFLEX) 500 mg capsule      cefTRIAXone (ROCEPHIN) 1 gram injection   2. Left foot pain M79.672 729.5 XR FOOT LT MIN 3 V      XR ANKLE LT MIN 3 V   3. Tetanus toxoid vaccination administered greater than 10 years ago Z78.9 V49.89 TETANUS, DIPHTHERIA TOXOIDS AND ACELLULAR PERTUSSIS VACCINE (TDAP), IN INDIVIDS. >=7, IM   4. Screening for depression Z13.89 V79.0 DC PATIENT SCREENED FOR DEPRESSION     Cellulitis of the L foot: Gave 1 g IM Ceftriaxone. Pt was watched for any potential allergic reactions for approx 25 min. Foot xray showed no acute abnormality. Left ankle xray showed soft tissue swelling. Keflex prescribed and sent to pharmacy. Told caretaker to obtain a tran to help pt ambulate. Pt is to go home and use RICE. Pt and caretaker that it is very important to RTC in am for evaluation, appt made prior to them leaving clinic. Precautions given to when to go to ED. Margins drawn around the cellulitis. Update immunization with TDAP    Sinus: use saline nasal spray. Pt states that worked for him last time. [id number] Amanda Monaco with plan    Follow-up Disposition:  Return in about 1 day (around 7/29/2017). I have discussed the diagnosis with the patient and the intended plan as seen in the above orders. Patient verbalized understanding of the plan and agrees with the plan.  The patient has received an after-visit summary and questions were answered concerning future plans. I have discussed medication side effects and warnings with the patient as well. Informed patient to return to the office if new symptoms arise.         Noel Loo, DO  Family Medicine Resident

## 2017-07-29 NOTE — PROGRESS NOTES
Chief Complaint   Patient presents with    Follow-up     follow up from yesterdays visit     he is a 80y.o. year old male who presents for evalution. Here to follow up on cellulitis of the left foot. He says he soaked it last night. He also noted that there was streaking up the leg yesterday and that has gone away. The caregiver says there is less swelling of the foot itself also  No fever or chills  Reviewed PmHx, RxHx, FmHx, SocHx, AllgHx and updated and dated in the chart. There are no active problems to display for this patient. Nurse notes were reviewed and copied and are correct  Review of Systems - negative except as listed above in the HPI    Objective:     Vitals:    07/29/17 1039   BP: 147/81   Pulse: 69   Resp: 16   Temp: 97.8 °F (36.6 °C)   TempSrc: Oral   SpO2: 98%   Weight: 167 lb (75.8 kg)   Height: 5' 8\" (1.727 m)     Physical Examination: General appearance - alert, well appearing, and in no distress  Mental status - alert, oriented to person, place, and time  Extremities - based on the ink markings there is less redness and edema than yesterday         Assessment/ Plan:   Diagnoses and all orders for this visit:    1. Cellulitis of left foot  -     mupirocin (BACTROBAN) 2 % ointment; Apply  to affected area daily. continue the antibiotic and add bactroban daily  Recheck in 1 week with Dr Ellie Ward    Follow-up Disposition:  Return in about 1 week (around 8/5/2017). ICD-10-CM ICD-9-CM    1. Cellulitis of left foot L03.116 682.7 mupirocin (BACTROBAN) 2 % ointment       I have discussed the diagnosis with the patient and the intended plan as seen in the above orders. The patient has received an after-visit summary and questions were answered concerning future plans.      Medication Side Effects and Warnings were discussed with patient: yes  Patient Labs were reviewed and or requested: yes  Patient Past Records were reviewed and or requested: yes        There are no Patient Instructions on file for this visit.     The patient verbalizes understanding and agrees with the plan of care        Patient has the advanced directives booklet to review

## 2017-11-05 ENCOUNTER — ED HISTORICAL/CONVERTED ENCOUNTER (OUTPATIENT)
Dept: OTHER | Age: 82
End: 2017-11-05